# Patient Record
(demographics unavailable — no encounter records)

---

## 2024-12-06 NOTE — HISTORY OF PRESENT ILLNESS
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  had ablation, gets occ spotting no gyn co.

## 2024-12-16 NOTE — HISTORY OF PRESENT ILLNESS
[7] : 7 [Localized] : localized [Radiating] : radiating [Sharp] : sharp [Throbbing] : throbbing [Constant] : constant [Sleep] : sleep [Injection therapy] : injection therapy [Full time] : Work status: full time [de-identified] : 46 year old female presnets RT middle triggering & pain. She has previously seen  for this problem.  She received 2 CSI with some relief. She is now with reoccurrence.  [] : no [FreeTextEntry1] : right middle finger [FreeTextEntry4] : ~ 1 year [FreeTextEntry5] : NKI, or fall. Patient has right middle trigger finger, saw Dr. Wilson in August of 2024 or same issue and had some relief with CSI  [FreeTextEntry6] : Locking,  [FreeTextEntry7] : shotting up right arm  [FreeTextEntry8] : waking up at night  [FreeTextEntry9] : CSI, tylenol  [de-identified] : using, movement, grabbing  [de-identified] : 8/7/24 [de-identified] : Dr. 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

## 2024-12-16 NOTE — PROCEDURE
[FreeTextEntry3] : Tendon sheath was performed of the right 3rd trigger finger. The indication for this procedure was pain and inflammation. The site was prepped with alcohol, ethyl chloride sprayed topically, and sterile technique used. An injection of Lidocaine 0.5cc of 1%, Triamcinolone (Kenalog) 0.5cc of 10 mg was used. Patient tolerated procedure well.   The risks, benefits, and alternatives have been discussed, and verbal consent was obtained.

## 2024-12-16 NOTE — DISCUSSION/SUMMARY
[de-identified] : Discussed the nature of the diagnosis and risk and benefits of different modalities of treatment. RT middle TF Discussed conservative management as symptoms demand vs CSI. Pt would like a CSI. This is #3 Done today and tolerated well. All questions answered.

## 2025-04-28 NOTE — DISCUSSION/SUMMARY
[de-identified] : Discussed the nature of the diagnosis and risk and benefits of different modalities of treatment. RT middle TF Discussed conservative management as symptoms demand vs CSI. Pt would like a CSI. This is #4 Done today and tolerated well. Should CSI fail to provide relief, pt will take OTC NSAID's and apply warm compresses. All questions answered.

## 2025-04-28 NOTE — HISTORY OF PRESENT ILLNESS
[Localized] : localized [Radiating] : radiating [Sharp] : sharp [Throbbing] : throbbing [Constant] : constant [Sleep] : sleep [Injection therapy] : injection therapy [Full time] : Work status: full time [10] : 10 [de-identified] : 46 year old female followed for RT middle TF. Last CSI 12/2024. She has had 3 CSI. With recurrence.  [] : no [FreeTextEntry1] : right middle finger [FreeTextEntry6] : Locking, swollen [FreeTextEntry7] : shotting up right arm  [FreeTextEntry8] : waking up at night, worse in morning [FreeTextEntry9] : CSI, tylenol  [de-identified] : using, movement, grabbing  [de-identified] : 8/7/24 [de-identified] : Dr. 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

## 2025-04-28 NOTE — DISCUSSION/SUMMARY
[de-identified] : Discussed the nature of the diagnosis and risk and benefits of different modalities of treatment. RT middle TF Discussed conservative management as symptoms demand vs CSI. Pt would like a CSI. This is #4 Done today and tolerated well. Should CSI fail to provide relief, pt will take OTC NSAID's and apply warm compresses. All questions answered. Stable

## 2025-04-28 NOTE — HISTORY OF PRESENT ILLNESS
[Localized] : localized [Radiating] : radiating [Sharp] : sharp [Throbbing] : throbbing [Constant] : constant [Sleep] : sleep [Injection therapy] : injection therapy [Full time] : Work status: full time [10] : 10 [de-identified] : 46 year old female followed for RT middle TF. Last CSI 12/2024. She has had 3 CSI. With recurrence.  [] : no [FreeTextEntry1] : right middle finger [FreeTextEntry6] : Locking, swollen [FreeTextEntry7] : shotting up right arm  [FreeTextEntry8] : waking up at night, worse in morning [FreeTextEntry9] : CSI, tylenol  [de-identified] : using, movement, grabbing  [de-identified] : 8/7/24 [de-identified] : Dr. 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